# Patient Record
Sex: MALE | URBAN - METROPOLITAN AREA
[De-identification: names, ages, dates, MRNs, and addresses within clinical notes are randomized per-mention and may not be internally consistent; named-entity substitution may affect disease eponyms.]

---

## 2024-01-26 ENCOUNTER — OFFICE VISIT (OUTPATIENT)
Dept: OTOLARYNGOLOGY | Facility: CLINIC | Age: 24
End: 2024-01-26
Payer: COMMERCIAL

## 2024-01-26 VITALS — WEIGHT: 268.94 LBS

## 2024-01-26 DIAGNOSIS — J33.9 NASAL POLYPOSIS: ICD-10-CM

## 2024-01-26 DIAGNOSIS — J32.2 CHRONIC ETHMOIDAL SINUSITIS: ICD-10-CM

## 2024-01-26 DIAGNOSIS — J01.00 ACUTE MAXILLARY SINUSITIS, RECURRENCE NOT SPECIFIED: Primary | ICD-10-CM

## 2024-01-26 DIAGNOSIS — R94.120 FAILED HEARING SCREENING: ICD-10-CM

## 2024-01-26 PROCEDURE — 31231 NASAL ENDOSCOPY DX: CPT | Mod: S$GLB,,, | Performed by: STUDENT IN AN ORGANIZED HEALTH CARE EDUCATION/TRAINING PROGRAM

## 2024-01-26 PROCEDURE — 99204 OFFICE O/P NEW MOD 45 MIN: CPT | Mod: 25,S$GLB,, | Performed by: STUDENT IN AN ORGANIZED HEALTH CARE EDUCATION/TRAINING PROGRAM

## 2024-01-26 PROCEDURE — 87070 CULTURE OTHR SPECIMN AEROBIC: CPT | Performed by: STUDENT IN AN ORGANIZED HEALTH CARE EDUCATION/TRAINING PROGRAM

## 2024-01-26 PROCEDURE — 99999 PR PBB SHADOW E&M-NEW PATIENT-LVL III: CPT | Mod: PBBFAC,,, | Performed by: STUDENT IN AN ORGANIZED HEALTH CARE EDUCATION/TRAINING PROGRAM

## 2024-01-26 PROCEDURE — 87186 SC STD MICRODIL/AGAR DIL: CPT | Performed by: STUDENT IN AN ORGANIZED HEALTH CARE EDUCATION/TRAINING PROGRAM

## 2024-01-26 PROCEDURE — 1159F MED LIST DOCD IN RCRD: CPT | Mod: CPTII,S$GLB,, | Performed by: STUDENT IN AN ORGANIZED HEALTH CARE EDUCATION/TRAINING PROGRAM

## 2024-01-26 PROCEDURE — 87077 CULTURE AEROBIC IDENTIFY: CPT | Performed by: STUDENT IN AN ORGANIZED HEALTH CARE EDUCATION/TRAINING PROGRAM

## 2024-01-26 RX ORDER — PREDNISONE 20 MG/1
20 TABLET ORAL DAILY
Qty: 5 TABLET | Refills: 0 | Status: SHIPPED | OUTPATIENT
Start: 2024-01-26 | End: 2024-01-31

## 2024-01-26 RX ORDER — SULFAMETHOXAZOLE AND TRIMETHOPRIM 800; 160 MG/1; MG/1
1 TABLET ORAL 2 TIMES DAILY
COMMUNITY
Start: 2024-01-23 | End: 2024-01-29

## 2024-01-26 RX ORDER — AMOXICILLIN AND CLAVULANATE POTASSIUM 875; 125 MG/1; MG/1
1 TABLET, FILM COATED ORAL 2 TIMES DAILY
Qty: 20 TABLET | Refills: 0 | Status: SHIPPED | OUTPATIENT
Start: 2024-01-26 | End: 2024-02-05

## 2024-01-26 RX ORDER — AZELASTINE 1 MG/ML
1 SPRAY, METERED NASAL 2 TIMES DAILY
Qty: 30 ML | Refills: 11 | Status: SHIPPED | OUTPATIENT
Start: 2024-01-26 | End: 2025-01-25

## 2024-01-26 RX ORDER — FLUTICASONE PROPIONATE 50 MCG
1 SPRAY, SUSPENSION (ML) NASAL 2 TIMES DAILY
Qty: 16 G | Refills: 11 | Status: SHIPPED | OUTPATIENT
Start: 2024-01-26 | End: 2025-01-25

## 2024-01-26 NOTE — PROGRESS NOTES
Otolaryngology Clinic Note    Subjective:       Patient ID: Rajiv Villegas is a 23 y.o. male.    Chief Complaint: Sinusitis and Hearing Loss (Right ear)      History of Present Illness: Rajiv Villegas is a 23 y.o. male presenting with history of fungal sinusitis. Reports 5-6 sinus surgeries in the past, last was surgery was 6 years ago. Were all at [facility]. Has been allergy tested, has allergy to mold, cats, dogs. No allergy treatment. Nose is pretty good. Just here for check up. Last check was 2 years ago, not sure where. Last scan was same time. Reports some polyps on left and was given steroids. He does rinse every morning. No nasal sprays. No allergy pill. No runny nose, congestion, pressure, vision changes.   Reports he failed hearing test on right for work 3 days ago. Does not have hearing test. Denies issues with this ear. Last abx for sinus infection was years ago.       No past surgical history on file.  No past medical history on file.  Social Determinants of Health     Tobacco Use: Not on file   Alcohol Use: Not on file   Financial Resource Strain: Not on file   Food Insecurity: Not on file   Transportation Needs: Not on file   Physical Activity: Not on file   Stress: Not on file   Social Connections: Not on file   Housing Stability: Not on file   Depression: Not on file     Review of patient's allergies indicates:  No Known Allergies  Current Outpatient Medications   Medication Instructions    amoxicillin-clavulanate 875-125mg (AUGMENTIN) 875-125 mg per tablet 1 tablet, Oral, 2 times daily    azelastine (ASTELIN) 137 mcg, Nasal, 2 times daily    fluticasone propionate (FLONASE) 50 mcg, Each Nostril, 2 times daily    predniSONE (DELTASONE) 20 mg, Oral, Daily    sulfamethoxazole-trimethoprim 800-160mg (BACTRIM DS) 800-160 mg Tab 1 tablet, Oral, 2 times daily         ENT ROS negative except as stated above.     Patient answers are not available for this visit.            Objective:      There were no  vitals filed for this visit.    General: NAD, well appearing  Eyes: Normal conjunctiva and lids  Face: symmetric, nerve intact  Nose: The nose is without any evidence of any deformity. The nasal mucosa is moist. The septum is deviated right. There is no evidence of septal hematoma. The turbinates are without abnormality.   Ears: The ears are with normal-appearing pinna. Examination of the canals is normal appearing bilaterally. There is no drainage or erythema noted. The tympanic membranes are normal appearing with pearly color, normal-appearing landmarks and normal light reflex. Hearing is grossly intact.  Mouth: No obvious abnormalities to the lips. The teeth are unremarkable. The gingivae are without any obvious evidence of infection or lesion. The oral mucosa is moist and pink. There are no obvious masses to the hard or soft palate.   Oropharynx: The uvula is midline.  The tongue is midline. The posterior pharynx is without erythema or exudate. The tonsils are normal appearing.  Salivary glands: The salivary glands are symmetric and not enlarged, no masses  Neck: No lymphadenopathy, trachea midline, thryoid not enlarged.  Psych: Normal mood and affect.   Neuro: Grossly intact  Speech: fluent    Procedure: Nasal Endoscopy  Indications: chronic sinusitis   Verbal consent obtained  Anesthesia: topical lidocaine and phenylephrine spray    Procedure: Rigid 0 degree endoscope was passed into each nare to nasopharynx showing findings below.     Septum is deviated right. Nasal mucosa inflamed. Turbinates are large. Middle turbinate absent BL. Ethmoids patent with no real polyps on right, maxillary sinus with uncinate still largely intact, purulence draining. SER clear on right. ON left, purulence within ethmoid, maxillary, SER. Uncinate same, polyps within anterior and posterior ethmoid area, suspect frontal is patent.          Assessment and Plan:       1. Acute maxillary sinusitis, recurrence not specified    2.  Chronic ethmoidal sinusitis    3. Failed hearing screening    4. Nasal polyposis          Will follow culture.   Reports history of AFS with multiple surgeries. No allergy tx in past. Not on  nasal steroids.     Rinses BID followed by flonase and astelin. Will switch to budesonide in rinse if polyps worsen, persist    Augmentin and 5 days prednisone right now, will follow culture    [place] could not do allergy treatment. Reviewed that long term this may benefit him with mold/fungal allergy    Hearing test on for next week with reported failed right hearing screen at work    RTC: after hearing test, then 3 months recheck sinuses    Plan of care was discussed in detail with the patient, who agreed with the plan as above. All questions were answered in detail.     Ana M Finney MD  Otolaryngology

## 2024-01-26 NOTE — PATIENT INSTRUCTIONS
PLEASE PERFORM SINUS RINSES 2TIMES DAILY UNTIL YOUR NEXT VISIT.  Then flonase and astelin twice a day.           DIRECTIONS FOR SINUS SALINE RINSE To see demonstration: Enter [email or web address].youteexteeube.com/watch?[email or web address] into the browser address box, or go to You tube, and under the search box, enter sinus rinse. Click on [facility] Sinus Rinse Video    Step 1    Step 1 Please wash your hands. Fill the clean bottle with the designated volume of warm distilled water, filtered water or previously boiled water. You may warm the water in a microwave but we recommend that you warm it in increments of five seconds. This is to avoid excessive heating of the water and damage to the device or scalding your nasal passage.    Step 2    Step 2 Cut the SINUS RINSE mixture packet at the corner and pour its contents into the bottle. Tighten the cap & tube on the bottle securely, place one finger over the tip of the cap and shake the bottle gently to dissolve the mixture.      Step 3    Step 3 Standing in front of a sink, bend forward to your comfort level and tilt your head down. Keeping your mouth open without holding your breath, place cap snugly against your nasal passage and SQUEEZE BOTTLE GENTLY until the solution starts draining from the OPPOSITE nasal passage or from your mouth. Keep squeezing the bottle GENTLY until at least 1/4 to 1/2 (60 to 120 mL) of the bottle is used for a proper rinse. Do not swallow the solution.    Step 4    Blow your nose gently, without pinching your nose completely because this will apply pressure on the eardrums. If tolerable, sniff in any residual solution remaining in the nasal passage once or twice prior to blowing your nose as this may clean out the posterior nasopharyngeal area (the area at the back of your nasal passage). Some solution will reach the back of the throat, so please spit it out. To help improve drainage of any residual solution, blow your nose gently while tilting your head  to the opposite side of the nasal passage that you just rinsed.    Step 5    Now repeat steps 3 & 4 for your other nasal passage.    Step 6     Air dry the Sinus Rinse bottle, cap, and tube on a clean paper towel, a glass plate to store the bottle cap and tube. If there is any solution leftover, please discard it. We recommend you make a fresh solution each time you rinse. Rinse 5 times each day, OR as directed by your physician. Warnings: DO NOT RINSE IF NASAL PASSAGE IS COMPLETELY BLOCKED OR IF YOU HAVE AN EAR INFECTION OR BLOCKED EARS. If you have had ear surgery, please contact your physician prior to irrigation. If you experience any pressure in your ears, stop the rinse and get further directions from your physician or contact our office during regular business hours. To avoid any ear discomfort: Heat the solution to lukewarm, do not use hot, boiling or cold water. Keep your mouth open. Do not hold your breath and if possible make the sound IRMA....IRMA... Make sure to take the position as shown. Gently squeeze 1/4 of the bottle at a time (60mL / 2 ounces). Stop the rinse if you feel any solution sensation near the ears. You may rinse with a partially blocked nasal passage. Please do not use for any other purposes. Please rinse at least ONE HOUR PRIOR to bedtime, in order to avoid any residual solution dripping in the throat.    >> Before using the SINUS RINSE kit, please inspect the cap, tube and bottle carefully for wear and tear. If any of the components appear discolored or cracked, please contact [employer] to obtain a replacement. You must follow the cleaning instructions provided in this brochure or cleaning instruction card prior to each use.    >> The SINUS RINSE bottle and mixture are to be used only for nasalirrigation. Do not use for any other purposes.    >> We recommend that you use the rinse ONE HOUR PRIOR to bedtime in order to avoid any residual solution dripping in the throat.    Tips to avoid  ear discomfort while rinsing    If you have had ear surgery, please contact your physician prior to irrigation. Do not use if you have an ear infection or blocked ears. Rinse with lukewarm water. Keep your mouth open. Do not hold your breath while rinsing. While rinsing, make sure to tilt your. Gently squeeze the bottle while rinsing; do not squeeze the bottle very forcefully. Stop the rinse if you feel a sensation of fluid near your ears.    Tips to avoid unexpected drainage after rinsing    In rare situations, especially if you have had sinus surgery, the saline solution can pool in the sinus cavities and nasal passages and then drip from your nostrils hours after rinsing. To avoid this harmless but annoying inconvenience, take one extra step after rinsing: lean forward, tilt your head sideways and gently blow your nose. Then, tilt your head to the other side and blow again. You may need to repeat this several times. This will help rid your nasal passages of any excess mucus and remaining saline solution. If you find yourself experiencing delayed drainage often, do not rinse right before leaving your house or going to bed.     Lying down 115/67 HR 88  Sitting 110/75   Standing 119/77      Pt denies lightheadness, dizziness, or feeling unwell.

## 2024-01-29 ENCOUNTER — TELEPHONE (OUTPATIENT)
Dept: OTOLARYNGOLOGY | Facility: CLINIC | Age: 24
End: 2024-01-29

## 2024-01-29 LAB — BACTERIA SPEC AEROBE CULT: ABNORMAL

## 2024-01-29 RX ORDER — SULFAMETHOXAZOLE AND TRIMETHOPRIM 800; 160 MG/1; MG/1
1 TABLET ORAL 2 TIMES DAILY
Qty: 20 TABLET | Refills: 0 | Status: SHIPPED | OUTPATIENT
Start: 2024-01-29 | End: 2024-02-08

## 2024-01-29 NOTE — TELEPHONE ENCOUNTER
----- Message from Ana M Finney MD sent at 1/29/2024  1:34 PM CST -----  Please let patient know that I am sending bactrim, as he sinus infection is resistant to the augmentin. He needs to pick this up and switch as soon as possible.

## 2024-01-31 ENCOUNTER — HOSPITAL ENCOUNTER (OUTPATIENT)
Dept: RADIOLOGY | Facility: HOSPITAL | Age: 24
Discharge: HOME | End: 2024-01-31
Attending: STUDENT IN AN ORGANIZED HEALTH CARE EDUCATION/TRAINING PROGRAM
Payer: COMMERCIAL

## 2024-01-31 ENCOUNTER — CLINICAL SUPPORT (OUTPATIENT)
Dept: AUDIOLOGY | Facility: CLINIC | Age: 24
End: 2024-01-31
Payer: COMMERCIAL

## 2024-01-31 ENCOUNTER — OFFICE VISIT (OUTPATIENT)
Dept: OTOLARYNGOLOGY | Facility: CLINIC | Age: 24
End: 2024-01-31
Payer: COMMERCIAL

## 2024-01-31 VITALS — WEIGHT: 267.44 LBS

## 2024-01-31 DIAGNOSIS — H90.3 ASYMMETRICAL SENSORINEURAL HEARING LOSS: Primary | ICD-10-CM

## 2024-01-31 DIAGNOSIS — H90.41 SENSORINEURAL HEARING LOSS (SNHL) OF RIGHT EAR WITH UNRESTRICTED HEARING OF LEFT EAR: ICD-10-CM

## 2024-01-31 DIAGNOSIS — J32.2 CHRONIC ETHMOIDAL SINUSITIS: ICD-10-CM

## 2024-01-31 DIAGNOSIS — J33.9 NASAL POLYPOSIS: ICD-10-CM

## 2024-01-31 DIAGNOSIS — H93.11 TINNITUS OF RIGHT EAR: ICD-10-CM

## 2024-01-31 DIAGNOSIS — H91.8X3 ASYMMETRICAL HEARING LOSS: ICD-10-CM

## 2024-01-31 DIAGNOSIS — J01.00 ACUTE MAXILLARY SINUSITIS, RECURRENCE NOT SPECIFIED: Primary | ICD-10-CM

## 2024-01-31 PROCEDURE — 70553 MRI BRAIN STEM W/O & W/DYE: CPT | Mod: TC,PO

## 2024-01-31 PROCEDURE — 92557 COMPREHENSIVE HEARING TEST: CPT | Mod: S$GLB,,, | Performed by: AUDIOLOGIST

## 2024-01-31 PROCEDURE — 25500020 PHARM REV CODE 255: Mod: PO | Performed by: STUDENT IN AN ORGANIZED HEALTH CARE EDUCATION/TRAINING PROGRAM

## 2024-01-31 PROCEDURE — 1159F MED LIST DOCD IN RCRD: CPT | Mod: CPTII,S$GLB,, | Performed by: STUDENT IN AN ORGANIZED HEALTH CARE EDUCATION/TRAINING PROGRAM

## 2024-01-31 PROCEDURE — 70553 MRI BRAIN STEM W/O & W/DYE: CPT | Mod: 26,,, | Performed by: RADIOLOGY

## 2024-01-31 PROCEDURE — 99999 PR PBB SHADOW E&M-EST. PATIENT-LVL I: CPT | Mod: PBBFAC,,, | Performed by: AUDIOLOGIST

## 2024-01-31 PROCEDURE — 92567 TYMPANOMETRY: CPT | Mod: S$GLB,,, | Performed by: AUDIOLOGIST

## 2024-01-31 PROCEDURE — A9585 GADOBUTROL INJECTION: HCPCS | Mod: PO | Performed by: STUDENT IN AN ORGANIZED HEALTH CARE EDUCATION/TRAINING PROGRAM

## 2024-01-31 PROCEDURE — 99214 OFFICE O/P EST MOD 30 MIN: CPT | Mod: S$GLB,,, | Performed by: STUDENT IN AN ORGANIZED HEALTH CARE EDUCATION/TRAINING PROGRAM

## 2024-01-31 PROCEDURE — 99999 PR PBB SHADOW E&M-EST. PATIENT-LVL II: CPT | Mod: PBBFAC,,, | Performed by: STUDENT IN AN ORGANIZED HEALTH CARE EDUCATION/TRAINING PROGRAM

## 2024-01-31 RX ORDER — GADOBUTROL 604.72 MG/ML
10 INJECTION INTRAVENOUS
Status: COMPLETED | OUTPATIENT
Start: 2024-01-31 | End: 2024-01-31

## 2024-01-31 RX ADMIN — GADOBUTROL 10 ML: 604.72 INJECTION INTRAVENOUS at 03:01

## 2024-01-31 NOTE — PROGRESS NOTES
Otolaryngology Clinic Note    Subjective:       Patient ID: Rajiv Villegas is a 23 y.o. male.    Chief Complaint: CRS, hearing loss      History of Present Illness: Rajiv Villegas is a 23 y.o. male presenting with history of fungal sinusitis. Reports 5-6 sinus surgeries in the past, last was surgery was 6 years ago. Were all at [facility]. Has been allergy tested, has allergy to mold, cats, dogs. No allergy treatment. Nose is pretty good. Just here for check up. Last check was 2 years ago, not sure where. Last scan was same time. Reports some polyps on left and was given steroids. He does rinse every morning. No nasal sprays. No allergy pill. No runny nose, congestion, pressure, vision changes.   Reports he failed hearing test on right for work 3 days ago. Does not have hearing test. Denies issues with this ear. Last abx for sinus infection was years ago.     1/31/24; RTC with audio. Placed on bactrim for culture of MRSA from sinuses. Doing rinses and dual sprays as directed. Reports gradual hearing loss on right. No dizziness, no facial nerve weakness. Nose feels fine today.       No past surgical history on file.  No past medical history on file.  Social Determinants of Health     Tobacco Use: Not on file   Alcohol Use: Not on file   Financial Resource Strain: Not on file   Food Insecurity: Not on file   Transportation Needs: Not on file   Physical Activity: Not on file   Stress: Not on file   Social Connections: Not on file   Housing Stability: Not on file   Depression: Not on file     Review of patient's allergies indicates:  No Known Allergies  Current Outpatient Medications   Medication Instructions    amoxicillin-clavulanate 875-125mg (AUGMENTIN) 875-125 mg per tablet 1 tablet, Oral, 2 times daily    azelastine (ASTELIN) 137 mcg, Nasal, 2 times daily    fluticasone propionate (FLONASE) 50 mcg, Each Nostril, 2 times daily    predniSONE (DELTASONE) 20 mg, Oral, Daily    sulfamethoxazole-trimethoprim 800-160mg  (BACTRIM DS) 800-160 mg Tab 1 tablet, Oral, 2 times daily         ENT ROS negative except as stated above.     Patient answers are not available for this visit.            Objective:      There were no vitals filed for this visit.    General: NAD, well appearing  Eyes: Normal conjunctiva and lids  Face: symmetric, nerve intact  Nose: The nose is without any evidence of any deformity. The nasal mucosa is moist. The septum is deviated right. There is no evidence of septal hematoma. The turbinates are without abnormality.   Ears: The ears are with normal-appearing pinna. Examination of the canals is normal appearing bilaterally. There is no drainage or erythema noted. The tympanic membranes are normal appearing with pearly color, normal-appearing landmarks and normal light reflex. Hearing is grossly intact.  Mouth: No obvious abnormalities to the lips. The teeth are unremarkable. The gingivae are without any obvious evidence of infection or lesion. The oral mucosa is moist and pink. There are no obvious masses to the hard or soft palate.   Oropharynx: The uvula is midline.  The tongue is midline. The posterior pharynx is without erythema or exudate. The tonsils are normal appearing.  Salivary glands: The salivary glands are symmetric and not enlarged, no masses  Neck: No lymphadenopathy, trachea midline, thryoid not enlarged.  Psych: Normal mood and affect.   Neuro: Grossly intact  Speech: fluent    Scope last week:     Procedure: Rigid 0 degree endoscope was passed into each nare to nasopharynx showing findings below.     Septum is deviated right. Nasal mucosa inflamed. Turbinates are large. Middle turbinate absent BL. Ethmoids patent with no real polyps on right, maxillary sinus with uncinate still largely intact, purulence draining. SER clear on right. ON left, purulence within ethmoid, maxillary, SER. Uncinate same, polyps within anterior and posterior ethmoid area, suspect frontal is patent.                 Component 5 d ago   Aerobic Bacterial Culture  Abnormal   STAPHYLOCOCCUS AUREUS  Moderate  No other significant isolate    Resulting Agency [facility]        Susceptibility     Staphylococcus aureus     CULTURE, AEROBIC  (SPECIFY SOURCE)     Clindamycin <=0.5 mcg/mL Resistant     Erythromycin >4 mcg/mL Resistant     Oxacillin <=0.25 mcg/mL Sensitive     Penicillin 2 mcg/mL Resistant     Tetracycline <=4 mcg/mL Sensitive     Trimeth/Sulfa <=0.5/9.5 m... Sensitive               Assessment and Plan:       1. Acute maxillary sinusitis, recurrence not specified    2. Chronic ethmoidal sinusitis    3. Nasal polyposis    4. Asymmetrical hearing loss    5. Sensorineural hearing loss (SNHL) of right ear with unrestricted hearing of left ear            Reports history of AFS with multiple surgeries. No allergy tx in past. Not on nasal steroids.     Rinses BID followed by flonase and astelin. Will switch to budesonide in rinse if polyps worsen, persist    On bactrim now for MRSA on culture of sinuses- complete, contact with concerns following    Works off [employer], could not do allergy treatment. Reviewed that long term this may benefit him with mold/fungal allergy    Audio today with profound HF SNHL on right. Discrm 80%. Reviewed that he needs MRI for this to rule out mass within his IAC. He understands.     RTC: will contact with Mri results, 3 months recheck sinuses    Plan of care was discussed in detail with the patient, who agreed with the plan as above. All questions were answered in detail.     Ana M Finney MD  Otolaryngology

## 2024-01-31 NOTE — Clinical Note
Please see attached audiological test results and recommendations. Please contact the patient regarding any further recommendations concerning the asymmetry.

## 2024-02-01 ENCOUNTER — TELEPHONE (OUTPATIENT)
Dept: OTOLARYNGOLOGY | Facility: CLINIC | Age: 24
End: 2024-02-01

## 2024-02-01 NOTE — TELEPHONE ENCOUNTER
Pt notified of results. Verbalizes understanding. Will follow up annually and let us know of any concerns.

## 2024-02-01 NOTE — PROGRESS NOTES
Please let the patient know that his MRI was thankfully negative for any concerning masses so while I am not sure why he has lost hearing on the right, we do not have to worry about a tumor and will just continue to monitor his hearing annually or sooner if he notes any sudden changes.

## 2024-02-01 NOTE — TELEPHONE ENCOUNTER
----- Message from Ana M Finney MD sent at 2/1/2024  7:29 AM CST -----  Please let the patient know that his MRI was thankfully negative for any concerning masses so while I am not sure why he has lost hearing on the right, we do not have to worry about a tumor and will just continue to monitor his hearing annually or sooner if he notes any sudden changes.